# Patient Record
Sex: MALE | Race: BLACK OR AFRICAN AMERICAN | NOT HISPANIC OR LATINO | URBAN - METROPOLITAN AREA
[De-identification: names, ages, dates, MRNs, and addresses within clinical notes are randomized per-mention and may not be internally consistent; named-entity substitution may affect disease eponyms.]

---

## 2021-01-19 ENCOUNTER — OFFICE VISIT (OUTPATIENT)
Dept: INTERNAL MEDICINE | Facility: CLINIC | Age: 31
End: 2021-01-19

## 2021-01-19 VITALS
HEART RATE: 92 BPM | HEIGHT: 62 IN | OXYGEN SATURATION: 100 % | SYSTOLIC BLOOD PRESSURE: 124 MMHG | WEIGHT: 162 LBS | TEMPERATURE: 97.7 F | DIASTOLIC BLOOD PRESSURE: 76 MMHG | BODY MASS INDEX: 29.81 KG/M2

## 2021-01-19 DIAGNOSIS — Z71.82 EXERCISE COUNSELING: ICD-10-CM

## 2021-01-19 DIAGNOSIS — Z00.00 ROUTINE GENERAL MEDICAL EXAMINATION AT A HEALTH CARE FACILITY: Primary | ICD-10-CM

## 2021-01-19 DIAGNOSIS — Z71.3 DIETARY COUNSELING: ICD-10-CM

## 2021-01-19 PROCEDURE — 99385 PREV VISIT NEW AGE 18-39: CPT | Performed by: PHYSICIAN ASSISTANT

## 2021-01-19 NOTE — PROGRESS NOTES
Ozzy Winters 30 y.o. male presents today for a routine physical    Chief Complaint   Patient presents with   • Establish Care     new patient   • Annual Exam     physical for foster care        HPI     Here for a physical, doing well today, no new complaints. Is looking to take on a foster child. His partner and him have a toddler and another baby on the way. He has no chronic illness or complaints today. No hx of medical problems.    Diet/Exercise: is working on healthy eating, going to the gym regularly. Limits pop.     Eye exam: has had vision screen in the last year.   Dental exam: sees dentist at .    No hx of hospitalizations or surgeries.   Got Tetanus vaccine when he came from [place] and they said he would always have a positive skin TB test due to that. Does not need TB test for foster care.     He had labs drawn a year ago for his insurance and everything was normal. He does not have insurance today and declines blood work.     Flu shot offered and declined.    Unsure of last tetanus shot and does not want that today.    Immunization History   Administered Date(s) Administered   • Hepatitis B Vaccine Adult IM 02/03/2015, 03/10/2015     Health Maintenance   Topic Date Due   • TDAP/TD VACCINES (1 - Tdap) 10/31/2007   • INFLUENZA VACCINE  01/19/2022 (Originally 8/1/2020)       Review of Systems   Constitutional: Negative for chills, fever and unexpected weight loss.   HENT: Negative for congestion, ear pain, rhinorrhea, sinus pressure, sore throat and trouble swallowing.    Eyes: Negative for photophobia, pain and visual disturbance.   Respiratory: Negative for cough, shortness of breath and wheezing.    Cardiovascular: Negative for chest pain and palpitations.   Gastrointestinal: Negative for abdominal pain, blood in stool, diarrhea, nausea and vomiting.   Endocrine: Negative for cold intolerance, heat intolerance, polydipsia and polyuria.   Genitourinary: Negative for difficulty urinating, dysuria,  "frequency, hematuria and urgency.   Musculoskeletal: Negative for gait problem, joint swelling, neck pain and neck stiffness.   Skin: Negative for rash and skin lesions.   Neurological: Negative for dizziness, seizures, syncope, weakness, headache and confusion.   Hematological: Does not bruise/bleed easily.   Psychiatric/Behavioral: Negative for hallucinations, sleep disturbance, suicidal ideas and depressed mood. The patient is not nervous/anxious.    All other systems reviewed and are negative.       There is no problem list on file for this patient.      Current Outpatient Medications on File Prior to Visit   Medication Sig Dispense Refill   • [DISCONTINUED] methylPREDNISolone (MEDROL, JIMOB,) 4 MG tablet Take as directed on package instructions. 1 each 0     No current facility-administered medications on file prior to visit.        No Known Allergies     History reviewed. No pertinent past medical history.     Past Surgical History:   Procedure Laterality Date   • NO PAST SURGERIES          Family History   Problem Relation Age of Onset   • Arthritis Mother         Social History     Socioeconomic History   • Marital status:      Spouse name: Not on file   • Number of children: Not on file   • Years of education: Not on file   • Highest education level: Not on file   Tobacco Use   • Smoking status: Never Smoker   • Smokeless tobacco: Never Used   Substance and Sexual Activity   • Alcohol use: No     Frequency: Never   • Drug use: No   • Sexual activity: Defer        Vitals:    01/19/21 1044   BP: 124/76   BP Location: Left arm   Patient Position: Sitting   Pulse: 92   Temp: 97.7 °F (36.5 °C)   TempSrc: Infrared   SpO2: 100%   Weight: 73.5 kg (162 lb)   Height: 156.5 cm (61.62\")      Body mass index is 30 kg/m².    Patient's Body mass index is 30 kg/m². BMI is above normal parameters. Recommendations include: exercise counseling and nutrition counseling.      Physical Exam  Vitals signs reviewed. "   Constitutional:       General: He is not in acute distress.     Appearance: Normal appearance. He is not ill-appearing.   HENT:      Head: Normocephalic and atraumatic.      Right Ear: Tympanic membrane, ear canal and external ear normal.      Left Ear: Tympanic membrane, ear canal and external ear normal.      Mouth/Throat:      Mouth: Mucous membranes are moist.      Pharynx: No oropharyngeal exudate or posterior oropharyngeal erythema.   Eyes:      General: No scleral icterus.     Extraocular Movements: Extraocular movements intact.      Conjunctiva/sclera: Conjunctivae normal.      Pupils: Pupils are equal, round, and reactive to light.   Neck:      Musculoskeletal: Normal range of motion and neck supple.   Cardiovascular:      Rate and Rhythm: Normal rate and regular rhythm.      Pulses: Normal pulses.      Heart sounds: Normal heart sounds. No murmur.   Pulmonary:      Effort: Pulmonary effort is normal. No respiratory distress.      Breath sounds: Normal breath sounds. No stridor. No wheezing, rhonchi or rales.   Abdominal:      General: Bowel sounds are normal. There is no distension.      Palpations: Abdomen is soft. There is no mass.      Tenderness: There is no abdominal tenderness. There is no guarding.   Musculoskeletal: Normal range of motion.         General: No signs of injury.      Right lower leg: No edema.      Left lower leg: No edema.   Lymphadenopathy:      Cervical: No cervical adenopathy.   Skin:     General: Skin is warm and dry.      Coloration: Skin is not jaundiced.      Findings: No rash.   Neurological:      General: No focal deficit present.      Mental Status: He is alert and oriented to person, place, and time.      Gait: Gait normal.   Psychiatric:         Mood and Affect: Mood normal.         Behavior: Behavior normal.         Diagnoses and all orders for this visit:    1. Routine general medical examination at a health care facility (Primary)    2. Dietary counseling    3.  Exercise counseling    *forms filled out for foster care and scanned into chart.    Counseled on health maintenance topics and preventative care recommendations. Follow up yearly for routine physical exams. Diet and exercise counseling given. See dentist and eye doctor yearly as directed.      Routine labs recommended, pt will defer until he has insurance. Recommended Tdap q 10 yr and flu shot q yr.     Return for Annual.    Kamila Hood PA-C

## 2021-01-19 NOTE — PROGRESS NOTES
"Chief Complaint  Establish Care (new patient) and Annual Exam (physical)    Subjective          History of Present Illness  Ozzy Winters presents to [facility] for physical. Is applying to be a .         Review of Systems   Constitutional: Negative for chills, fever and unexpected weight loss.   HENT: Negative for congestion, rhinorrhea and sore throat.    Respiratory: Negative for cough, shortness of breath and wheezing.    Cardiovascular: Negative for chest pain.   Gastrointestinal: Negative for abdominal pain, diarrhea and vomiting.     No Known Allergies  Current Outpatient Medications on File Prior to Visit   Medication Sig Dispense Refill   • methylPREDNISolone (MEDROL, JIMBO,) 4 MG tablet Take as directed on package instructions. 1 each 0     No current facility-administered medications on file prior to visit.      Social History     Tobacco Use   Smoking Status Never Smoker   Smokeless Tobacco Never Used        Objective   Vital Signs:   /76 (BP Location: Left arm, Patient Position: Sitting)   Pulse 92   Temp 97.7 °F (36.5 °C) (Infrared)   Ht 156.5 cm (61.62\")   Wt 73.5 kg (162 lb)   SpO2 100%   BMI 30.00 kg/m²     Physical Exam  Vitals signs reviewed.   Constitutional:       General: He is not in acute distress.     Appearance: Normal appearance. He is not ill-appearing.   HENT:      Head: Normocephalic and atraumatic.      Right Ear: Tympanic membrane, ear canal and external ear normal.      Left Ear: Tympanic membrane, ear canal and external ear normal.      Mouth/Throat:      Mouth: Mucous membranes are moist.      Pharynx: No oropharyngeal exudate or posterior oropharyngeal erythema.   Eyes:      General: No scleral icterus.     Extraocular Movements: Extraocular movements intact.      Conjunctiva/sclera: Conjunctivae normal.      Pupils: Pupils are equal, round, and reactive to light.   Neck:      Musculoskeletal: Normal range of motion and " neck supple.   Cardiovascular:      Rate and Rhythm: Normal rate and regular rhythm.      Pulses: Normal pulses.      Heart sounds: Normal heart sounds. No murmur.   Pulmonary:      Effort: Pulmonary effort is normal. No respiratory distress.      Breath sounds: Normal breath sounds. No stridor. No wheezing, rhonchi or rales.   Abdominal:      General: Bowel sounds are normal. There is no distension.      Palpations: Abdomen is soft. There is no mass.      Tenderness: There is no abdominal tenderness. There is no guarding.   Musculoskeletal: Normal range of motion.         General: No signs of injury.      Right lower leg: No edema.      Left lower leg: No edema.   Lymphadenopathy:      Cervical: No cervical adenopathy.   Skin:     General: Skin is warm and dry.      Coloration: Skin is not jaundiced.      Findings: No rash.   Neurological:      General: No focal deficit present.      Mental Status: He is alert and oriented to person, place, and time.      Gait: Gait normal.   Psychiatric:         Mood and Affect: Mood normal.         Behavior: Behavior normal.          Result Review :                   Assessment and Plan    There are no diagnoses linked to this encounter.      Follow Up   No follow-ups on file.    Follow up if symptoms worsen or persist or has new or concerning symptoms, go to ER for severe symptoms.   If labs or images are ordered we will contact you with the results within the next week.  If you have not heard from us after a week please call our office to inquire about the results.     Kamila Hood PA-C    Patient was given instructions and counseling regarding his condition or for health maintenance advice. Please see specific information pulled into the AVS if appropriate.     * Please note that portions of this note may have been completed with a voice recognition program. Efforts were made to edit the dictation but occasionally words are erroneously transcribed.

## 2022-05-06 ENCOUNTER — TELEPHONE (OUTPATIENT)
Dept: INTERNAL MEDICINE | Facility: CLINIC | Age: 32
End: 2022-05-06